# Patient Record
Sex: MALE | Race: WHITE | NOT HISPANIC OR LATINO | Employment: OTHER | URBAN - METROPOLITAN AREA
[De-identification: names, ages, dates, MRNs, and addresses within clinical notes are randomized per-mention and may not be internally consistent; named-entity substitution may affect disease eponyms.]

---

## 2020-03-11 ENCOUNTER — OFFICE VISIT (OUTPATIENT)
Dept: UROLOGY | Facility: CLINIC | Age: 45
End: 2020-03-11
Payer: MEDICAID

## 2020-03-11 DIAGNOSIS — N50.89 TESTICULAR SWELLING, RIGHT: ICD-10-CM

## 2020-03-11 DIAGNOSIS — N43.3 HYDROCELE, UNSPECIFIED HYDROCELE TYPE: Primary | ICD-10-CM

## 2020-03-11 PROCEDURE — 99999 PR PBB SHADOW E&M-NEW PATIENT-LVL II: ICD-10-PCS | Mod: PBBFAC,,, | Performed by: NURSE PRACTITIONER

## 2020-03-11 PROCEDURE — 99202 OFFICE O/P NEW SF 15 MIN: CPT | Mod: PBBFAC,PN | Performed by: NURSE PRACTITIONER

## 2020-03-11 PROCEDURE — 99203 PR OFFICE/OUTPT VISIT, NEW, LEVL III, 30-44 MIN: ICD-10-PCS | Mod: S$PBB,,, | Performed by: NURSE PRACTITIONER

## 2020-03-11 PROCEDURE — 99203 OFFICE O/P NEW LOW 30 MIN: CPT | Mod: S$PBB,,, | Performed by: NURSE PRACTITIONER

## 2020-03-11 PROCEDURE — 99999 PR PBB SHADOW E&M-NEW PATIENT-LVL II: CPT | Mod: PBBFAC,,, | Performed by: NURSE PRACTITIONER

## 2020-03-11 NOTE — PATIENT INSTRUCTIONS
Hydrocele (Non-Communicating)  The testicles first form in the abdomen of the male fetus. Just before birth, the testicles move down into the scrotum. As this happens, fluid from the abdomen may pass into the scrotum. This fluid collects in a small sac within the scrotum and appears as a small swelling or bulge there.  This bulge is called a hydrocele (non-communicating type). It is painless and causes no harm. In most cases the fluid is absorbed during the first 1 to 2 years of age. Sometimes it persists beyond 2 years of age and needs to be corrected with surgery.  Home care  A small hydrocele will not interfere in any way with normal activity. There are no special precautions that need to be taken.  Follow-up care  Follow up with your healthcare provider, or as advised. This is to be sure the hydrocele is shrinking as expected.  When to seek medical advice  Call your healthcare provider right away if any of these occur:  · Pain, redness or tenderness in the hydrocele  · A new bulge in the groin that appears just above the thigh crease or in the scrotum  · Changes in size of the hydrocele. This can mean that is gets smaller, then larger, then smaller. This can also mean that it gets larger and stays larger.  · Testicular pain  Date Last Reviewed: 9/1/2016  © 9939-3993 [employer], [employer]. [address] [place] [address], [place] [address]. All rights reserved. This information is not intended as a substitute for professional medical care. Always follow your healthcare professional's instructions.

## 2020-06-01 ENCOUNTER — OFFICE VISIT (OUTPATIENT)
Dept: UROLOGY | Facility: CLINIC | Age: 45
End: 2020-06-01
Payer: MEDICAID

## 2020-06-01 VITALS
WEIGHT: 227.94 LBS | BODY MASS INDEX: 33.76 KG/M2 | SYSTOLIC BLOOD PRESSURE: 133 MMHG | HEART RATE: 83 BPM | HEIGHT: 69 IN | DIASTOLIC BLOOD PRESSURE: 90 MMHG

## 2020-06-01 DIAGNOSIS — I86.1 RIGHT VARICOCELE: Primary | ICD-10-CM

## 2020-06-01 DIAGNOSIS — N50.82 SCROTAL PAIN: ICD-10-CM

## 2020-06-01 PROCEDURE — 99214 OFFICE O/P EST MOD 30 MIN: CPT | Mod: S$PBB,,, | Performed by: UROLOGY

## 2020-06-01 PROCEDURE — 99999 PR PBB SHADOW E&M-EST. PATIENT-LVL III: ICD-10-PCS | Mod: PBBFAC,,, | Performed by: UROLOGY

## 2020-06-01 PROCEDURE — 99213 OFFICE O/P EST LOW 20 MIN: CPT | Mod: PBBFAC,PN | Performed by: UROLOGY

## 2020-06-01 PROCEDURE — 99999 PR PBB SHADOW E&M-EST. PATIENT-LVL III: CPT | Mod: PBBFAC,,, | Performed by: UROLOGY

## 2020-06-01 PROCEDURE — 99214 PR OFFICE/OUTPT VISIT, EST, LEVL IV, 30-39 MIN: ICD-10-PCS | Mod: S$PBB,,, | Performed by: UROLOGY

## 2020-06-01 RX ORDER — HYOSCYAMINE SULFATE 0.125 MG
TABLET ORAL
COMMUNITY
Start: 2018-10-18

## 2020-06-01 RX ORDER — METFORMIN HYDROCHLORIDE 500 MG/1
TABLET ORAL
COMMUNITY
Start: 2020-04-14

## 2020-06-01 RX ORDER — TRIAMCINOLONE ACETONIDE 5 MG/G
OINTMENT TOPICAL
COMMUNITY
Start: 2019-11-18 | End: 2020-11-17

## 2020-06-01 RX ORDER — SIMVASTATIN 20 MG/1
TABLET, FILM COATED ORAL
COMMUNITY
Start: 2018-09-06

## 2020-06-01 RX ORDER — LISINOPRIL 20 MG/1
20 TABLET ORAL
COMMUNITY
Start: 2018-09-06

## 2020-06-01 NOTE — PROGRESS NOTES
"Subjective:      Turner Strong is a 45 y.o. male who returns today regarding his     Chronic scrotal pain    3 previous ing hernia surgeries    He is bothered by large size of right testis    He is not trying to father children.        The following portions of the patient's history were reviewed and updated as appropriate: allergies, current medications, past family history, past medical history, past social history, past surgical history and problem list.    Review of Systems  Pertinent items are noted in HPI.  A comprehensive multipoint review of systems was negative except as otherwise stated in the HPI.     Objective:   Vitals: BP (!) 133/90 (BP Location: Left arm, Patient Position: Sitting, BP Method: Large (Automatic))   Pulse 83   Ht 5' 8.5" (1.74 m)   Wt 103.4 kg (227 lb 15.3 oz)   BMI 34.16 kg/m²     Physical Exam   General: alert and oriented, no acute distress  Respiratory: Symmetric expansion, non-labored breathing  Cardiovascular: normal to inspection  Abdomen: non distended   Skin: normal coloration and turgor, no rashes, no suspicious skin lesions noted  Neuro: no gross deficits  Psych: normal judgment and insight, normal mood/affect and non-anxious  No hydrocele on exam on either side  Left testis normal  Right testis enlarged and indurated  Right varicocele reduces when supine    Physical Exam    Lab Review   Urinalysis demonstrates no specimen  Lab Results   Component Value Date    WBC 7.63 01/05/2009    HGB 16.6 01/05/2009    HCT 46.4 01/05/2009    MCV 78.2 (L) 01/05/2009     01/05/2009     No results found for: CREATININE, BUN    Imaging                 Last Resulted: 03/20/20 18:30 Order Details View Encounter Lab and Collection Details Routing Result History            External Result Report     External Result Report   Narrative     EXAMINATION:  US SCROTUM AND TESTICLES    CLINICAL HISTORY:  right testicular swelling; Hydrocele, unspecified    TECHNIQUE:  Multiple real-time " sonographic images were obtained of the scrotum.    COMPARISON:  No relevant prior imaging examinations are available for correlation.    FINDINGS:  The right testicle measures 4.2 x 2.8 x 3.3 cm in the left testicle measures 4.1 x 2.5 x 3.0 cm.    There is a cyst in the dorsal aspect of the right testicle measures 0.32 cm in greatest dimension.    The left testicle is homogeneous in echogenicity.    There is normal color and Doppler arterial flow to the testicles bilaterally.    There is a cyst in the head of the right epididymis measuring 5 mm in greatest dimension.    The left epididymis is normal.    There is a small hydrocele on the left and a varicocele on the right.   Impression       Cysts in the right testicle and in the right epididymis.    Small varicocele on the right and a small hydrocele on the left.      Electronically signed by: Jennifer Norton MD  Date: 03/20/2020  Time: 18:30    Encounter     View Encounter             Signed by     Signed Credentials Date/Time  Phone Pager   JENNIFER NORTON MD 3/20/2020 18:30 [phone]    Reviewed By     Estrellita Lerma NP on 3/23/2020 09:21   Exam Details     Performed Procedure Technologist Supporting Staff Performing Physician   US Scrotum And Testicles RT Uriel        Appointment Date/Status Modality Department    3/20/2020     Completed [facility] US1 [facility] ULTRASOUND       Begin Exam End Exam  End Exam Questionnaires   3/20/2020  3:58 PM 3/20/2020  4:51 PM  IMAGING END ALL      Reason for Exam   Priority: Routine   right testicular swelling   Dx: Hydrocele, unspecified hydrocele type [N43.3 (ICD-10-CM)]; Testicular swelling, right [N50.89 (ICD-10-CM)]   Comments:    Order Report     Order Details       Assessment and Plan:   Right varicocele    Scrotal pain      US abdomen to rule out ab mass causing varicocele  Reassured pt;NSAIDs    RTC after above

## 2020-06-02 ENCOUNTER — TELEPHONE (OUTPATIENT)
Dept: UROLOGY | Facility: CLINIC | Age: 45
End: 2020-06-02

## 2020-06-02 NOTE — TELEPHONE ENCOUNTER
Spoke to the patient dad and informed he will speak to his son about the appointment that is on 06/05/ at 1:00 do to AUTISM .

## 2021-04-22 ENCOUNTER — TELEPHONE (OUTPATIENT)
Dept: UROLOGY | Facility: CLINIC | Age: 46
End: 2021-04-22

## 2023-03-05 NOTE — PROGRESS NOTES
"Subjective:      Turner Strong is a 44 y.o. male who was self-referred for evaluation of hydrocele.      Mr. Strong presents to discuss right hydrocele. He reports painless scrotal swelling that started over one year ago. He reports having "botched surgery" with Urologist from [facility] in July of 2019.  He brings printed [facility] testicular US report from last September which revealed "complex right testicular cyst"   Reports 3 inguinal hernia repairs with mesh in the past 5 years. He also reports history of penile cyst removal (a few years ago).  He denies penile discharge, denies dysuria, hematuria, urgency and frequency. He expresses desire for surgical intervention if possible.     The following portions of the patient's history were reviewed and updated as appropriate: allergies, current medications, past family history, past medical history, past social history, past surgical history and problem list.    Review of Systems  Constitutional: no fever or chills  ENT: no nasal congestion or sore throat  Respiratory: no cough or shortness of breath  Cardiovascular: no chest pain or palpitations  Gastrointestinal: no nausea or vomiting, tolerating diet  Genitourinary: as per HPI  Hematologic/Lymphatic: no easy bruising or lymphadenopathy  Musculoskeletal: no arthralgias or myalgias  Neurological: no seizures or tremors  Behavioral/Psych: no auditory or visual hallucinations     Objective:   Vitals: There were no vitals taken for this visit.    Physical Exam   General: alert and oriented, no acute distress  Head: normocephalic, atraumatic  Neck: supple, no lymphadenopathy, normal ROM, no masses  Respiratory: Symmetric expansion, non-labored breathing  Cardiovascular: regular rate and rhythm, nomal pulses, no peripheral edema  Abdomen: soft, non tender, non distended, no palpable masses, no hernias, no hepatomegaly or splenomegaly  Genitourinary:   Penis: normal, no lesions, patent orthotopic meatus, no plaques  Scrotum: no " rashes or skin changes;   Testes: descended bilaterally, no masses, nontender, possible moderate sized hydrocele right testes   Prostate: deferred  Lymphatic: no inguinal nodes  Skin: normal coloration and turgor, no rashes, no suspicious skin lesions noted  Neuro: alert and oriented x3, no gross deficits  Psych: normal judgment and insight, normal mood/affect and non-anxious    Physical Exam    Lab Review   Urinalysis demonstrates glucose (1000)  Lab Results   Component Value Date    WBC 7.63 01/05/2009    HGB 16.6 01/05/2009    HCT 46.4 01/05/2009    MCV 78.2 (L) 01/05/2009     01/05/2009     No results found for: CREATININE, BUN  No results found for: PSA     Imaging    -Testicular US scanned to chart     Assessment:     1. Hydrocele, unspecified hydrocele type    2. Testicular swelling, right        Plan:     Orders Placed This Encounter    US Scrotum And Testicles      --Testicular Ultrasound now per pt request due to increase in swelling since last ultrasound was performed.   --Follow up appt made with Dr Quiroz on 4/6 to review previous surgical records, review recent testicular US and discuss moving forward with surgical repair of right hydrocele.      L bleeding ear

## 2023-06-06 ENCOUNTER — TELEPHONE (OUTPATIENT)
Dept: UROLOGY | Facility: CLINIC | Age: 48
End: 2023-06-06
Payer: MEDICAID

## 2023-06-06 NOTE — TELEPHONE ENCOUNTER
I spoke with patient, who agreed to appt with Jackie De La Cruz NP date,time,location.    I spoke with patient's dad and advised that atient call me back, I left message on patient phone to call back.    ----- Message from Tori Colin sent at 6/6/2023  9:52 AM CDT -----  Pt requesting call back RE: would like to schedule appt for in large testicle, nothing available in epic. Pt only wants to be seen at main campus       Confirmed contact below:  Contact Name:Turner Strong  Phone Number: [phone]

## 2023-07-19 ENCOUNTER — TELEPHONE (OUTPATIENT)
Dept: UROLOGY | Facility: CLINIC | Age: 48
End: 2023-07-19
Payer: MEDICAID

## 2023-07-19 NOTE — TELEPHONE ENCOUNTER
----- Message from Anglejoaquina Trujillo sent at 7/19/2023  1:03 PM CDT -----  Type:  Sooner Apoointment Request    Caller is requesting a sooner appointment.  Caller declined first available appointment listed below.  Caller will not accept being placed on the waitlist and is requesting a message be sent to doctor.  Name of Caller: pt's dad Turner  When is the first available appointment? none  Symptoms:  Would the patient rather a call back or a response via [facility]? Call   Best Call Back Number:[phone]  Additional Information:  needs to r/s his appt for tomorrow. Pt has Medicaid

## 2023-08-29 ENCOUNTER — OFFICE VISIT (OUTPATIENT)
Dept: UROLOGY | Facility: CLINIC | Age: 48
End: 2023-08-29
Payer: MEDICAID

## 2023-08-29 VITALS
HEIGHT: 69 IN | HEART RATE: 94 BPM | WEIGHT: 209 LBS | BODY MASS INDEX: 30.96 KG/M2 | DIASTOLIC BLOOD PRESSURE: 89 MMHG | SYSTOLIC BLOOD PRESSURE: 145 MMHG

## 2023-08-29 DIAGNOSIS — N50.812 LEFT TESTICULAR PAIN: Primary | ICD-10-CM

## 2023-08-29 DIAGNOSIS — N50.89 SWELLING OF LEFT TESTICLE: ICD-10-CM

## 2023-08-29 PROCEDURE — 3079F DIAST BP 80-89 MM HG: CPT | Mod: CPTII,,, | Performed by: NURSE PRACTITIONER

## 2023-08-29 PROCEDURE — 99213 OFFICE O/P EST LOW 20 MIN: CPT | Mod: PBBFAC,PO | Performed by: NURSE PRACTITIONER

## 2023-08-29 PROCEDURE — 4010F PR ACE/ARB THEARPY RXD/TAKEN: ICD-10-PCS | Mod: CPTII,,, | Performed by: NURSE PRACTITIONER

## 2023-08-29 PROCEDURE — 1159F PR MEDICATION LIST DOCUMENTED IN MEDICAL RECORD: ICD-10-PCS | Mod: CPTII,,, | Performed by: NURSE PRACTITIONER

## 2023-08-29 PROCEDURE — 99999 PR PBB SHADOW E&M-EST. PATIENT-LVL III: ICD-10-PCS | Mod: PBBFAC,,, | Performed by: NURSE PRACTITIONER

## 2023-08-29 PROCEDURE — 1160F RVW MEDS BY RX/DR IN RCRD: CPT | Mod: CPTII,,, | Performed by: NURSE PRACTITIONER

## 2023-08-29 PROCEDURE — 3077F PR MOST RECENT SYSTOLIC BLOOD PRESSURE >= 140 MM HG: ICD-10-PCS | Mod: CPTII,,, | Performed by: NURSE PRACTITIONER

## 2023-08-29 PROCEDURE — 1160F PR REVIEW ALL MEDS BY PRESCRIBER/CLIN PHARMACIST DOCUMENTED: ICD-10-PCS | Mod: CPTII,,, | Performed by: NURSE PRACTITIONER

## 2023-08-29 PROCEDURE — 3008F BODY MASS INDEX DOCD: CPT | Mod: CPTII,,, | Performed by: NURSE PRACTITIONER

## 2023-08-29 PROCEDURE — 99204 OFFICE O/P NEW MOD 45 MIN: CPT | Mod: S$PBB,,, | Performed by: NURSE PRACTITIONER

## 2023-08-29 PROCEDURE — 1159F MED LIST DOCD IN RCRD: CPT | Mod: CPTII,,, | Performed by: NURSE PRACTITIONER

## 2023-08-29 PROCEDURE — 4010F ACE/ARB THERAPY RXD/TAKEN: CPT | Mod: CPTII,,, | Performed by: NURSE PRACTITIONER

## 2023-08-29 PROCEDURE — 3077F SYST BP >= 140 MM HG: CPT | Mod: CPTII,,, | Performed by: NURSE PRACTITIONER

## 2023-08-29 PROCEDURE — 3079F PR MOST RECENT DIASTOLIC BLOOD PRESSURE 80-89 MM HG: ICD-10-PCS | Mod: CPTII,,, | Performed by: NURSE PRACTITIONER

## 2023-08-29 PROCEDURE — 99999 PR PBB SHADOW E&M-EST. PATIENT-LVL III: CPT | Mod: PBBFAC,,, | Performed by: NURSE PRACTITIONER

## 2023-08-29 PROCEDURE — 99204 PR OFFICE/OUTPT VISIT, NEW, LEVL IV, 45-59 MIN: ICD-10-PCS | Mod: S$PBB,,, | Performed by: NURSE PRACTITIONER

## 2023-08-29 PROCEDURE — 3008F PR BODY MASS INDEX (BMI) DOCUMENTED: ICD-10-PCS | Mod: CPTII,,, | Performed by: NURSE PRACTITIONER

## 2023-08-29 NOTE — PROGRESS NOTES
Subjective:       Patient ID: Turner Strong is a 48 y.o. male.    Chief Complaint: Enlarged Testicles    Patient is new to me. He is a 47 yo WM who is here today with c/o left testicular swelling with mild left testicular pain. Patient reports his left testicle sits in the toilet commode water when he sits on toilet.     Other  This is a chronic (testicular swelling) problem. The current episode started more than 1 year ago. The problem occurs daily. The problem has been gradually worsening. Pertinent negatives include no abdominal pain, anorexia, change in bowel habit, chills, fatigue, fever, headaches, nausea, swollen glands, urinary symptoms, vomiting or weakness. Nothing aggravates the symptoms. He has tried nothing for the symptoms.     Review of Systems   Constitutional:  Negative for chills, fatigue and fever.   Gastrointestinal:  Negative for abdominal pain, anorexia, change in bowel habit, constipation, diarrhea, nausea, vomiting and change in bowel habit.   Genitourinary:  Positive for scrotal swelling (left) and testicular pain (left; intermittent). Negative for decreased urine volume, difficulty urinating, discharge, dysuria, frequency, hematuria, penile pain, penile swelling and urgency.        Nocturia x0-1   Neurological:  Negative for dizziness, weakness and headaches.         Objective:      Physical Exam  Vitals and nursing note reviewed.   Constitutional:       General: He is not in acute distress.     Appearance: He is well-developed. He is obese. He is not ill-appearing.   HENT:      Head: Normocephalic and atraumatic.   Eyes:      Pupils: Pupils are equal, round, and reactive to light.   Cardiovascular:      Rate and Rhythm: Normal rate.   Pulmonary:      Effort: Pulmonary effort is normal. No respiratory distress.   Abdominal:      Palpations: Abdomen is soft.      Tenderness: There is no abdominal tenderness.   Genitourinary:     Testes:         Right: Mass, tenderness or swelling not  present.         Left: Mass, tenderness or swelling not present.      Comments: Bilateral hanging testicles  Musculoskeletal:         General: Normal range of motion.      Cervical back: Normal range of motion.   Skin:     General: Skin is warm and dry.   Neurological:      Mental Status: He is alert and oriented to person, place, and time.      Coordination: Coordination normal.   Psychiatric:         Mood and Affect: Mood normal.         Behavior: Behavior normal.         Thought Content: Thought content normal.         Judgment: Judgment normal.         Assessment:       Problem List Items Addressed This Visit    None  Visit Diagnoses       Left testicular pain    -  Primary    Relevant Orders    US Scrotum And Testicles    Swelling of left testicle        Relevant Orders    US Scrotum And Testicles            Plan:           Turner was seen today for enlarged testicles.    Diagnoses and all orders for this visit:    Left testicular pain  -     US Scrotum And Testicles; Future    Swelling of left testicle  -     US Scrotum And Testicles; Future    Follow-up pending U/S results.    Jackie De La Cruz NP

## 2023-08-30 ENCOUNTER — HOSPITAL ENCOUNTER (OUTPATIENT)
Dept: RADIOLOGY | Facility: HOSPITAL | Age: 48
Discharge: HOME | End: 2023-08-30
Attending: NURSE PRACTITIONER
Payer: MEDICAID

## 2023-08-30 DIAGNOSIS — N50.89 SWELLING OF LEFT TESTICLE: ICD-10-CM

## 2023-08-30 DIAGNOSIS — N50.812 LEFT TESTICULAR PAIN: ICD-10-CM

## 2023-08-30 PROCEDURE — 76870 US SCROTUM AND TESTICLES: ICD-10-PCS | Mod: 26,,, | Performed by: STUDENT IN AN ORGANIZED HEALTH CARE EDUCATION/TRAINING PROGRAM

## 2023-08-30 PROCEDURE — 76870 US EXAM SCROTUM: CPT | Mod: 26,,, | Performed by: STUDENT IN AN ORGANIZED HEALTH CARE EDUCATION/TRAINING PROGRAM

## 2023-08-30 PROCEDURE — 76870 US EXAM SCROTUM: CPT | Mod: TC

## 2023-09-01 ENCOUNTER — TELEPHONE (OUTPATIENT)
Dept: UROLOGY | Facility: CLINIC | Age: 48
End: 2023-09-01
Payer: MEDICAID

## 2023-09-01 NOTE — TELEPHONE ENCOUNTER
----- Message from Jackie De La Cruz NP sent at 9/1/2023  1:26 PM CDT -----  Please inform patient via telephone that his U/S of scrotum an testicles showed a small right intratesticular cyst, a small left hydrocele, and small left varicocele was noted. These are all benign findings. We will continue to monitor since patient is not having any pain.

## 2023-09-05 ENCOUNTER — TELEPHONE (OUTPATIENT)
Dept: UROLOGY | Facility: CLINIC | Age: 48
End: 2023-09-05
Payer: MEDICAID

## 2023-09-05 NOTE — TELEPHONE ENCOUNTER
Returned pt call, no answer    ----- Message from Estrellita Dyer sent at 9/5/2023 10:38 AM CDT -----  Contact: pt  Type:  Patient Returning Call    Who Called:pt  Who Left Message for Patient:Esperanza   Does the patient know what this is regarding?:US   Would the patient rather a call back or a response via [facility]? Call   Best Call Back Number:[phone]  Additional Information:

## 2023-09-05 NOTE — TELEPHONE ENCOUNTER
Pt was called twice and again this morning, no answer - unable to leave voicemail.    ----- Message from Alana Gross sent at 9/2/2023  9:12 AM CDT -----  Type:  Patient Returning Call    Who Called: pt  Who Left Message for Patient:  Does the patient know what this is regarding?:yes  Would the patient rather a call back or a response via [facility]? call  Best Call Back Number: [phone]  Additional Information: says it's about the ultrasound; said he wants to know if NP De La Cruz seen any fluid in the scrotum and if he can have an appt to meet the doctor

## 2023-09-06 ENCOUNTER — TELEPHONE (OUTPATIENT)
Dept: UROLOGY | Facility: CLINIC | Age: 48
End: 2023-09-06
Payer: MEDICAID

## 2023-09-06 NOTE — TELEPHONE ENCOUNTER
----- Message from Clare Cabrera sent at 9/6/2023 11:33 AM CDT -----  Type:  Patient Returning Call    Who Called:pt  Who Left Message for Patient:office  Does the patient know what this is regarding?:n/a  Would the patient rather a call back or a response via [facility]? call  Best Call Back Number:[phone]  Additional Information:

## 2023-09-06 NOTE — TELEPHONE ENCOUNTER
----- Message from Genny Padgett sent at 9/6/2023 11:25 AM CDT -----  Type:  Needs Medical Advice    Who Called:  Pt    Would the patient rather a call back or a response via [facility]?  call  Best Call Back Number:  [phone]  Additional Information:  Pt would like a call back from Rn regarding his US results. Pt would like a call as soon as possible.

## 2023-09-06 NOTE — TELEPHONE ENCOUNTER
Spoke to patient and results given, patient requested to speak to provider, bunny Cason telephone encounter appt made for patient to discuss this with him

## 2023-09-07 ENCOUNTER — TELEPHONE (OUTPATIENT)
Dept: UROLOGY | Facility: CLINIC | Age: 48
End: 2023-09-07
Payer: MEDICAID

## 2023-09-07 ENCOUNTER — OFFICE VISIT (OUTPATIENT)
Dept: UROLOGY | Facility: CLINIC | Age: 48
End: 2023-09-07
Payer: MEDICAID

## 2023-09-07 DIAGNOSIS — N44.2 TESTICULAR CYST: ICD-10-CM

## 2023-09-07 DIAGNOSIS — N43.3 LEFT HYDROCELE: ICD-10-CM

## 2023-09-07 DIAGNOSIS — I86.1 LEFT VARICOCELE: ICD-10-CM

## 2023-09-07 DIAGNOSIS — N50.89 SWELLING OF LEFT TESTICLE: Primary | ICD-10-CM

## 2023-09-07 PROCEDURE — 1160F PR REVIEW ALL MEDS BY PRESCRIBER/CLIN PHARMACIST DOCUMENTED: ICD-10-PCS | Mod: CPTII,95,, | Performed by: NURSE PRACTITIONER

## 2023-09-07 PROCEDURE — 99212 OFFICE O/P EST SF 10 MIN: CPT | Mod: 95,,, | Performed by: NURSE PRACTITIONER

## 2023-09-07 PROCEDURE — 4010F PR ACE/ARB THEARPY RXD/TAKEN: ICD-10-PCS | Mod: CPTII,95,, | Performed by: NURSE PRACTITIONER

## 2023-09-07 PROCEDURE — 1159F PR MEDICATION LIST DOCUMENTED IN MEDICAL RECORD: ICD-10-PCS | Mod: CPTII,95,, | Performed by: NURSE PRACTITIONER

## 2023-09-07 PROCEDURE — 1159F MED LIST DOCD IN RCRD: CPT | Mod: CPTII,95,, | Performed by: NURSE PRACTITIONER

## 2023-09-07 PROCEDURE — 1160F RVW MEDS BY RX/DR IN RCRD: CPT | Mod: CPTII,95,, | Performed by: NURSE PRACTITIONER

## 2023-09-07 PROCEDURE — 99212 PR OFFICE/OUTPT VISIT, EST, LEVL II, 10-19 MIN: ICD-10-PCS | Mod: 95,,, | Performed by: NURSE PRACTITIONER

## 2023-09-07 PROCEDURE — 4010F ACE/ARB THERAPY RXD/TAKEN: CPT | Mod: CPTII,95,, | Performed by: NURSE PRACTITIONER

## 2023-09-07 NOTE — TELEPHONE ENCOUNTER
----- Message from Clare Cabrera sent at 9/7/2023  1:15 PM CDT -----  Type:  Needs Medical Advice    Who Called: pt    Would the patient rather a call back or a response via [facility]? call  Best Call Back Number: [phone]  Additional Information: pt would like to be contacted before 4 has been waiting since 11

## 2023-09-07 NOTE — TELEPHONE ENCOUNTER
Spoke to patient and notified him that the provider attempted to call him twice this morning to discuss his concerns, notified him to stay around his phone because she will call him around 4 or before 4. He understood

## 2023-09-07 NOTE — TELEPHONE ENCOUNTER
Second attempt follow-up telephone call placed to patient for our scheduled telephone encounter for 11:00 AM, but no answer. Voice message left for patient to return call.    Jackie De La Cruz NP

## 2023-09-07 NOTE — PROGRESS NOTES
Audio Only Telehealth Visit     The patient location is: Home  The chief complaint leading to consultation is: Discuss U/S results  Visit type: Virtual visit with audio only (telephone)  Total time spent with patient, documenting, and reviewing chart: Approximately 10 minutes.     The reason for the audio only service rather than synchronous audio and video virtual visit was related to technical difficulties or patient preference/necessity.     Each patient to whom I provide medical services by telemedicine is:  (1) informed of the relationship between the physician and patient and the respective role of any other health care provider with respect to management of the patient; and (2) notified that they may decline to receive medical services by telemedicine and may withdraw from such care at any time. Patient verbally consented to receive this service via voice-only telephone call.       HPI:   Patient is here today via telephone encounter to discuss U/S of scrotum and testicles results from 8/30/2023. Patient re-informed that his U/S showed a small right intratesticular cyst, a small left hydrocele, and a left varicocele. Patient states that the radiologist is lying on his report because his left testicle is enlarged. I informed patient that during our office visit I examined him and his testicles was not enlarged. I also informed patient at that time that the reason his testicles was sitting in the toilet water was due to his excessive skin causing his testicles to hang lower than normal. After educating patient, he is still focus on the radiologist, stating that he lied in his report. Patient stated that 7/24/2019 he had a scrotal surgery performed by Dr. Degroot at [facility]. He reports the sx was unsuccessful and that the radiologist from 8/30/2023 should have reported that on his most recent U/S. Patient reports seeing multiple urologists in the past for the same issue (swelling of testicle, but upon exam no  swelling noted). He reports those urologist refused to schedule him for surgery because they claim everything was normal. He reports seeing Caridad Lim Mutter, and Estrellita Gu in the past as well (per patient). He reports being discharged from Dr. Chaudhry urology clinic. After allowing patient to talk, I informed him once more that his U/S showed benign findings which should be monitored. However, to try and give patient a piece of mind and lower anxiety I also discussed possibly scheduling him for a left hydrocelectomy and varicocelectomy if insurance approve it. I discuss risk involved and alternatives. After all this, patient still reverted back to the radiologist not being honest in his report. I informed patient that if we can not come to a resolve and if he does not believe the report and the radiologist, then he can always get a second opinion. Patient reports that there is a conspiracy.     Assessment and plan:       Diagnoses and all orders for this visit:    Swelling of left testicle    Left hydrocele    Left varicocele    Testicular cyst    Follow-up with PCP for referral to another urologist for second opinion.     Jackie De La Cruz NP           This service was not originating from a related E/M service provided within the previous 7 days nor will  to an E/M service or procedure within the next 24 hours or my soonest available appointment.  Prevailing standard of care was able to be met in this audio-only visit.

## 2023-09-07 NOTE — TELEPHONE ENCOUNTER
Follow-up telephone call placed to patient, but no answer. Voice message left for patient stating I will try calling him again around 11 AM today to discuss U/S results of scrotum and testicles.     Jackie De La Cruz NP

## 2023-09-07 NOTE — TELEPHONE ENCOUNTER
----- Message from Clare Cabrera sent at 9/7/2023 11:56 AM CDT -----  Type:  Needs Medical Advice    Who Called: pt    Would the patient rather a call back or a response via [facility]? call  Best Call Back Number: [phone]  Additional Information: pt requesting a call back to discuss   Virtual visit.

## 2023-09-07 NOTE — TELEPHONE ENCOUNTER
Spoke to patient and notified him that I just explained to him that the provider called him twice today, notified him that she will contact him when she has time it may or may not be before 4PM